# Patient Record
Sex: FEMALE | Race: WHITE | NOT HISPANIC OR LATINO | Employment: FULL TIME | URBAN - METROPOLITAN AREA
[De-identification: names, ages, dates, MRNs, and addresses within clinical notes are randomized per-mention and may not be internally consistent; named-entity substitution may affect disease eponyms.]

---

## 2021-08-23 ENCOUNTER — TRANSFERRED RECORDS (OUTPATIENT)
Dept: HEALTH INFORMATION MANAGEMENT | Facility: CLINIC | Age: 46
End: 2021-08-23

## 2021-09-11 ENCOUNTER — TRANSFERRED RECORDS (OUTPATIENT)
Dept: HEALTH INFORMATION MANAGEMENT | Facility: CLINIC | Age: 46
End: 2021-09-11

## 2021-09-15 ENCOUNTER — TRANSCRIBE ORDERS (OUTPATIENT)
Dept: OTHER | Age: 46
End: 2021-09-15

## 2021-09-15 ENCOUNTER — MEDICAL CORRESPONDENCE (OUTPATIENT)
Dept: HEALTH INFORMATION MANAGEMENT | Facility: CLINIC | Age: 46
End: 2021-09-15

## 2021-09-15 DIAGNOSIS — G47.33 OSA (OBSTRUCTIVE SLEEP APNEA): Primary | ICD-10-CM

## 2021-09-28 ENCOUNTER — TELEPHONE (OUTPATIENT)
Dept: OTOLARYNGOLOGY | Facility: CLINIC | Age: 46
End: 2021-09-28

## 2021-09-28 NOTE — TELEPHONE ENCOUNTER
----- Message from Valentino Mahmood MD sent at 9/28/2021  1:26 PM CDT -----  Regarding: Inspire appointment  The patient made an appointment to discuss inspire this Friday.  I reviewed her sleep study and she is not a candidate for inspire.     Inspire requires the AHI (sleep apnea severity, calculated on sleep study) needs to be less than 65 (her AHI is 67.8) and a BMI of less than 32 kg/m2 (her BMI is 35.8 kg/m2).     I am happy to meet with her to discuss further if she would like, but she will ultimately need to return to sleep medicine to discuss other options.      Could you contact her and let her know?    IJL

## 2021-09-29 NOTE — PROGRESS NOTES
Chief Complaint   Patient presents with     Ent Problem     would like to discuss getting inspire- history of severe snoring and poor sleep     History of Present Illness   Meliza Rob is a 46 year old female who presents today for evaluation.  The patient describes long history of obstructive sleep apnea.  Several years ago, the patient had a sleep study showing significant sleep apnea with an AHI well over 100.  She is lost about 40 to 50 pounds since that time and recently underwent a repeat home sleep study.      The patient underwent a home sleep study on 8/23/2021 which showed an AHI of 67.8 events per hour.  The patient supine AHI was 89, left side AHI was 69.7, right-sided AHI was 30.9.  The patient's BMI at the time of the study was 35.8 kg/m     The patient has tried CPAP and several masks.  She has tried a chinstrap in the past without benefit.  She has a history of TMJ and did not pursue oral appliance therapy given her significant problems.  The patient's current BMI is 34.9 kg/m .  To be a BMI of less than 32, the patient will need to be roughly 192 pounds, she is 210 pounds today.  She does continue to work on weight loss.    Past Medical History  There is no problem list on file for this patient.    Current Medications     Current Outpatient Medications:      amLODIPine (NORVASC) 5 MG tablet, Take 10 mg by mouth daily Unsure of dose, Disp: , Rfl:      cetirizine (ZYRTEC) 10 MG tablet, Take 10 mg by mouth daily, Disp: , Rfl:      hydrochlorothiazide (HYDRODIURIL) 12.5 MG tablet, Take 12.5 mg by mouth daily, Disp: , Rfl:      pioglitazone (ACTOS) 15 MG tablet, Take 15 mg by mouth daily, Disp: , Rfl:      Semaglutide (OZEMPIC, 0.25 OR 0.5 MG/DOSE, SC), Once weekly, Disp: , Rfl:     Allergies  Allergies   Allergen Reactions     Bactrim [Sulfamethoxazole W/Trimethoprim] Hives     Lisinopril      Lips swell up     Amoxicillin Hives       Social History   Social History     Socioeconomic History      "Marital status: Single     Spouse name: Not on file     Number of children: Not on file     Years of education: Not on file     Highest education level: Not on file   Occupational History     Not on file   Tobacco Use     Smoking status: Not on file   Substance and Sexual Activity     Alcohol use: Not on file     Drug use: Not on file     Sexual activity: Not on file   Other Topics Concern     Not on file   Social History Narrative     Not on file     Social Determinants of Health     Financial Resource Strain:      Difficulty of Paying Living Expenses:    Food Insecurity:      Worried About Running Out of Food in the Last Year:      Ran Out of Food in the Last Year:    Transportation Needs:      Lack of Transportation (Medical):      Lack of Transportation (Non-Medical):    Physical Activity:      Days of Exercise per Week:      Minutes of Exercise per Session:    Stress:      Feeling of Stress :    Social Connections:      Frequency of Communication with Friends and Family:      Frequency of Social Gatherings with Friends and Family:      Attends Sabianist Services:      Active Member of Clubs or Organizations:      Attends Club or Organization Meetings:      Marital Status:    Intimate Partner Violence:      Fear of Current or Ex-Partner:      Emotionally Abused:      Physically Abused:      Sexually Abused:        Family History  Family History   Problem Relation Age of Onset     Diabetes Mother      Hypertension Mother      Diabetes Father      Hypertension Father      Hypertension Brother        Review of Systems  As per HPI and PMHx, otherwise 10+ comprehensive system review is negative.    Physical Exam  BP (!) 142/104 (BP Location: Right arm, Patient Position: Chair, Cuff Size: Adult Regular)   Temp 99  F (37.2  C) (Tympanic)   Ht 1.651 m (5' 5\")   Wt 95.3 kg (210 lb)   BMI 34.95 kg/m    GENERAL: Patient is a pleasant, cooperative 46 year old female in no acute distress.  HEAD: Normocephalic, " atraumatic.  Hair and scalp are normal.  EYES: Pupils are equal, round, reactive to light and accommodation.  Extraocular movements are intact.  The sclera nonicteric without injection.  The extraocular structures are normal.  EARS: Normal shape and symmetry.  No tenderness when palpating the mastoid or tragal areas bilaterally.   NOSE: Nares are patent.  Nasal mucosa is pink and moist.  Nasal septum is midline.  The inferior turbinates appear normal.  No nasal cavity masses, polyps, or mucopurulence on anterior rhinoscopy.  ORAL CAVITY: Dentition is in good repair.  Mucous membranes are moist.  Tongue is mobile, protrudes to the midline.  Palate elevates symmetrically.  Tonsils are 2+, symmetric.  No erythema or exudate.  No oral cavity or oropharyngeal masses, lesions, ulcerations, leukoplakia.  The patient has a Land tongue/palate position grade 4.  NECK: Supple, trachea is midline.  The patient has 3 fingerbreadths of hyomental distance.  There no palpable cervical lymphadenopathy or masses bilaterally.  Palpation of the bilateral parotid and submandibular areas reveal no masses.  No thyromegaly.    NEUROLOGIC: Cranial nerves II through XII are grossly intact.  Voice is strong.  Patient is House-Brackmann I/VI bilaterally.  CARDIOVASCULAR: Extremities are warm and well-perfused.  No significant peripheral edema.  RESPIRATORY: Patient has nonlabored breathing without cough, wheeze, stridor.  PSYCHIATRIC: Patient is alert and oriented.  Mood and affect appear normal.  SKIN: Warm and dry.  No scalp, face, or neck lesions noted.    Assessment and Plan     ICD-10-CM    1. Severe obstructive sleep apnea  G47.33    2. Intolerance of continuous positive airway pressure (CPAP) ventilation  Z78.9    3. BMI 34.0-34.9,adult  Z68.34      It was my pleasure seeing Meliza YULISA Darron today in clinic.  The patient presents to clinic today with severe obstructive sleep apnea intolerant to CPAP.  The patient's BMI is is just  under 35.  They are interested in the hypoglossal nerve stimulator.  We discussed placement of the hypoglossal nerve stimulator including postoperative course, activation, need for battery change, limitation of the chest/abdomen/pelvis MRI, need for alteration of airport screening.  We discussed the need of drug induced sleep endoscopy to ensure candidacy.      Given the patient's AHI also being out of range, I think that she be a candidate for an in lab full night study.  I will reach out to our sleep medicine folks to see if they can set her up with a new study.     We discussed the risks, benefits, alternatives, options of drug-induced sleep endoscopy including, but not, limited to: risk of general anesthesia, potential need for additional procedures.  We discussed the postoperative course and convalescence and need for  the day of the procedure.  The patient voiced understanding and is willing to proceed.    Valentino Mahmood MD  Department of Otolaryngology-Head and Neck Surgery  [facility] [place]

## 2021-10-01 ENCOUNTER — OFFICE VISIT (OUTPATIENT)
Dept: OTOLARYNGOLOGY | Facility: CLINIC | Age: 46
End: 2021-10-01
Payer: COMMERCIAL

## 2021-10-01 VITALS
TEMPERATURE: 99 F | WEIGHT: 210 LBS | BODY MASS INDEX: 34.99 KG/M2 | HEIGHT: 65 IN | SYSTOLIC BLOOD PRESSURE: 142 MMHG | DIASTOLIC BLOOD PRESSURE: 104 MMHG

## 2021-10-01 DIAGNOSIS — G47.33 SEVERE OBSTRUCTIVE SLEEP APNEA: Primary | ICD-10-CM

## 2021-10-01 DIAGNOSIS — Z78.9 INTOLERANCE OF CONTINUOUS POSITIVE AIRWAY PRESSURE (CPAP) VENTILATION: ICD-10-CM

## 2021-10-01 PROCEDURE — 99204 OFFICE O/P NEW MOD 45 MIN: CPT | Performed by: OTOLARYNGOLOGY

## 2021-10-01 RX ORDER — PIOGLITAZONEHYDROCHLORIDE 15 MG/1
15 TABLET ORAL DAILY
COMMUNITY

## 2021-10-01 RX ORDER — CETIRIZINE HYDROCHLORIDE 10 MG/1
10 TABLET ORAL DAILY
COMMUNITY

## 2021-10-01 RX ORDER — AMLODIPINE BESYLATE 5 MG/1
10 TABLET ORAL DAILY
COMMUNITY

## 2021-10-01 RX ORDER — HYDROCHLOROTHIAZIDE 12.5 MG/1
12.5 TABLET ORAL DAILY
COMMUNITY

## 2021-10-01 ASSESSMENT — PAIN SCALES - GENERAL: PAINLEVEL: NO PAIN (0)

## 2021-10-01 ASSESSMENT — MIFFLIN-ST. JEOR: SCORE: 1598.43

## 2021-10-01 NOTE — LETTER
10/1/2021         RE: Meliza Rob  [address] [address]        Dear Colleague,    Thank you for referring your patient, Meliza Rob, to the [facility]. Please see a copy of my visit note below.    Chief Complaint   Patient presents with     Ent Problem     would like to discuss getting inspire- history of severe snoring and poor sleep     History of Present Illness   Meliza Rob is a 46 year old female who presents today for evaluation.  The patient describes long history of obstructive sleep apnea.  Several years ago, the patient had a sleep study showing significant sleep apnea with an AHI well over 100.  She is lost about 40 to 50 pounds since that time and recently underwent a repeat home sleep study.      The patient underwent a home sleep study on 8/23/2021 which showed an AHI of 67.8 events per hour.  The patient supine AHI was 89, left side AHI was 69.7, right-sided AHI was 30.9.  The patient's BMI at the time of the study was 35.8 kg/m     The patient has tried CPAP and several masks.  She has tried a chinstrap in the past without benefit.  She has a history of TMJ and did not pursue oral appliance therapy given her significant problems.  The patient's current BMI is 34.9 kg/m .  To be a BMI of less than 32, the patient will need to be roughly 192 pounds, she is 210 pounds today.  She does continue to work on weight loss.    Past Medical History  There is no problem list on file for this patient.    Current Medications     Current Outpatient Medications:      amLODIPine (NORVASC) 5 MG tablet, Take 10 mg by mouth daily Unsure of dose, Disp: , Rfl:      cetirizine (ZYRTEC) 10 MG tablet, Take 10 mg by mouth daily, Disp: , Rfl:      hydrochlorothiazide (HYDRODIURIL) 12.5 MG tablet, Take 12.5 mg by mouth daily, Disp: , Rfl:      pioglitazone (ACTOS) 15 MG tablet, Take 15 mg by mouth daily, Disp: , Rfl:      Semaglutide (OZEMPIC, 0.25 OR 0.5 MG/DOSE, SC), Once  weekly, Disp: , Rfl:     Allergies  Allergies   Allergen Reactions     Bactrim [Sulfamethoxazole W/Trimethoprim] Hives     Lisinopril      Lips swell up     Amoxicillin Hives       Social History   Social History     Socioeconomic History     Marital status: Single     Spouse name: Not on file     Number of children: Not on file     Years of education: Not on file     Highest education level: Not on file   Occupational History     Not on file   Tobacco Use     Smoking status: Not on file   Substance and Sexual Activity     Alcohol use: Not on file     Drug use: Not on file     Sexual activity: Not on file   Other Topics Concern     Not on file   Social History Narrative     Not on file     Social Determinants of Health     Financial Resource Strain:      Difficulty of Paying Living Expenses:    Food Insecurity:      Worried About Running Out of Food in the Last Year:      Ran Out of Food in the Last Year:    Transportation Needs:      Lack of Transportation (Medical):      Lack of Transportation (Non-Medical):    Physical Activity:      Days of Exercise per Week:      Minutes of Exercise per Session:    Stress:      Feeling of Stress :    Social Connections:      Frequency of Communication with Friends and Family:      Frequency of Social Gatherings with Friends and Family:      Attends Yazidism Services:      Active Member of Clubs or Organizations:      Attends Club or Organization Meetings:      Marital Status:    Intimate Partner Violence:      Fear of Current or Ex-Partner:      Emotionally Abused:      Physically Abused:      Sexually Abused:        Family History  Family History   Problem Relation Age of Onset     Diabetes Mother      Hypertension Mother      Diabetes Father      Hypertension Father      Hypertension Brother        Review of Systems  As per HPI and PMHx, otherwise 10+ comprehensive system review is negative.    Physical Exam  BP (!) 142/104 (BP Location: Right arm, Patient Position: Chair,  "Cuff Size: Adult Regular)   Temp 99  F (37.2  C) (Tympanic)   Ht 1.651 m (5' 5\")   Wt 95.3 kg (210 lb)   BMI 34.95 kg/m    GENERAL: Patient is a pleasant, cooperative 46 year old female in no acute distress.  HEAD: Normocephalic, atraumatic.  Hair and scalp are normal.  EYES: Pupils are equal, round, reactive to light and accommodation.  Extraocular movements are intact.  The sclera nonicteric without injection.  The extraocular structures are normal.  EARS: Normal shape and symmetry.  No tenderness when palpating the mastoid or tragal areas bilaterally.   NOSE: Nares are patent.  Nasal mucosa is pink and moist.  Nasal septum is midline.  The inferior turbinates appear normal.  No nasal cavity masses, polyps, or mucopurulence on anterior rhinoscopy.  ORAL CAVITY: Dentition is in good repair.  Mucous membranes are moist.  Tongue is mobile, protrudes to the midline.  Palate elevates symmetrically.  Tonsils are 2+, symmetric.  No erythema or exudate.  No oral cavity or oropharyngeal masses, lesions, ulcerations, leukoplakia.  The patient has a Land tongue/palate position grade 4.  NECK: Supple, trachea is midline.  The patient has 3 fingerbreadths of hyomental distance.  There no palpable cervical lymphadenopathy or masses bilaterally.  Palpation of the bilateral parotid and submandibular areas reveal no masses.  No thyromegaly.    NEUROLOGIC: Cranial nerves II through XII are grossly intact.  Voice is strong.  Patient is House-Brackmann I/VI bilaterally.  CARDIOVASCULAR: Extremities are warm and well-perfused.  No significant peripheral edema.  RESPIRATORY: Patient has nonlabored breathing without cough, wheeze, stridor.  PSYCHIATRIC: Patient is alert and oriented.  Mood and affect appear normal.  SKIN: Warm and dry.  No scalp, face, or neck lesions noted.    Assessment and Plan     ICD-10-CM    1. Severe obstructive sleep apnea  G47.33    2. Intolerance of continuous positive airway pressure (CPAP) " ventilation  Z78.9    3. BMI 34.0-34.9,adult  Z68.34      It was my pleasure seeing Meliza Rob today in clinic.  The patient presents to clinic today with severe obstructive sleep apnea intolerant to CPAP.  The patient's BMI is is just under 35.  They are interested in the hypoglossal nerve stimulator.  We discussed placement of the hypoglossal nerve stimulator including postoperative course, activation, need for battery change, limitation of the chest/abdomen/pelvis MRI, need for alteration of airport screening.  We discussed the need of drug induced sleep endoscopy to ensure candidacy.      Given the patient's AHI also being out of range, I think that she be a candidate for an in lab full night study.  I will reach out to our sleep medicine folks to see if they can set her up with a new study.     We discussed the risks, benefits, alternatives, options of drug-induced sleep endoscopy including, but not, limited to: risk of general anesthesia, potential need for additional procedures.  We discussed the postoperative course and convalescence and need for  the day of the procedure.  The patient voiced understanding and is willing to proceed.    Valentino Mahmood MD  Department of Otolaryngology-Head and Neck Surgery  [facility]        Again, thank you for allowing me to participate in the care of your patient.        Sincerely,        Valentino Mahmood MD

## 2021-10-14 DIAGNOSIS — G47.33 OSA (OBSTRUCTIVE SLEEP APNEA): Primary | ICD-10-CM

## 2022-02-11 ENCOUNTER — THERAPY VISIT (OUTPATIENT)
Dept: SLEEP MEDICINE | Facility: CLINIC | Age: 47
End: 2022-02-11
Payer: COMMERCIAL

## 2022-02-11 DIAGNOSIS — G47.33 OSA (OBSTRUCTIVE SLEEP APNEA): ICD-10-CM

## 2022-02-11 PROCEDURE — 95810 POLYSOM 6/> YRS 4/> PARAM: CPT | Performed by: INTERNAL MEDICINE

## 2022-02-22 LAB — SLPCOMP: NORMAL

## 2022-03-03 NOTE — PROGRESS NOTES
Chief Complaint   Patient presents with     Ent Problem     Consult Inspire-sleep study - has tried CPAP machine several years ago and did not like it - would come off at night     History of Present Illness  Meliza Rob is a 46 year old female who presents today for follow-up. The patient describes long history of obstructive sleep apnea.  Several years ago, the patient had a sleep study showing significant sleep apnea with an AHI well over 100.  She is lost about 40 to 50 pounds since that time and underwent a repeat home sleep study on 8/23/2021 which showed an AHI of 67.8 events per hour.  The patient supine AHI was 89, left side AHI was 69.7, right-sided AHI was 30.9.  The patient's BMI at the time of the study was 35.8 kg/m . The patient has tried CPAP and several masks.  She has tried a chinstrap in the past without benefit.  She has a history of TMJ and did not pursue oral appliance therapy given her significant problems.      Given that the patient's AHI was out of range, we pursued an in lab full night study.  The patient underwent a full night in lab study on 2/11/2022. The polysomnogram revealed a presence of 20 obstructive, 5 central, and 2 mixed apneas resulting in an apnea index of 3.8 events per hour. There were 108 obstructive hypopneas and 0 central hypopneas resulting in an obstructive hypopnea index of 15.3 and central hypopnea index of - events per hour. The combined apnea/hypopnea index was 19.1 events per hour (central apnea/hypopnea index was 0.7 events per hour). The REM AHI was 23.8 events per hour. The supine AHI was 30.2 events per hour; 13.5/hour non-supine.  The patient's BMI at the time of the most recent sleep study was 35 kg/m .    The patient's current BMI is 34.28 kg/m .  To be a BMI of less than 32, the patient will need to be roughly 192 pounds, she is 206 pounds today.  She does continue to work on weight loss.    Past Medical History  There is no problem list on file for  this patient.    Current Medications    Current Outpatient Medications:      amLODIPine (NORVASC) 5 MG tablet, Take 10 mg by mouth daily Unsure of dose, Disp: , Rfl:      cetirizine (ZYRTEC) 10 MG tablet, Take 10 mg by mouth daily, Disp: , Rfl:      hydrochlorothiazide (HYDRODIURIL) 12.5 MG tablet, Take 12.5 mg by mouth daily, Disp: , Rfl:      pioglitazone (ACTOS) 15 MG tablet, Take 15 mg by mouth daily, Disp: , Rfl:      Semaglutide (OZEMPIC, 0.25 OR 0.5 MG/DOSE, SC), Once weekly, Disp: , Rfl:     Allergies  Allergies   Allergen Reactions     Bactrim [Sulfamethoxazole W/Trimethoprim] Hives     Lisinopril      Lips swell up     Amoxicillin Hives       Social History  Social History     Socioeconomic History     Marital status: Single     Spouse name: Not on file     Number of children: Not on file     Years of education: Not on file     Highest education level: Not on file   Occupational History     Not on file   Tobacco Use     Smoking status: Never Smoker     Smokeless tobacco: Never Used   Substance and Sexual Activity     Alcohol use: Not on file     Comment: occas     Drug use: Never     Sexual activity: Not on file   Other Topics Concern     Not on file   Social History Narrative     Not on file     Social Determinants of Health     Financial Resource Strain: Not on file   Food Insecurity: Not on file   Transportation Needs: Not on file   Physical Activity: Not on file   Stress: Not on file   Social Connections: Not on file   Intimate Partner Violence: Not on file   Housing Stability: Not on file       Family History  Family History   Problem Relation Age of Onset     Diabetes Mother      Hypertension Mother      Diabetes Father      Hypertension Father      Hypertension Brother        Review of Systems  As per HPI and PMHx, otherwise 10 system review including the head and neck, constitutional, eyes, respiratory, GI, skin, neurologic, lymphatic, endocrine, and allergy systems is negative.    Physical  "Exam  BP (!) 137/95 (BP Location: Left arm, Patient Position: Sitting, Cuff Size: Adult Regular)   Pulse 94   Temp 98.1  F (36.7  C) (Tympanic)   Ht 1.651 m (5' 5\")   Wt 93.4 kg (206 lb)   BMI 34.28 kg/m    GENERAL: Patient is a pleasant, cooperative 46 year old female in no acute distress.  HEAD: Normocephalic, atraumatic.  Hair and scalp are normal.  EYES: Pupils are equal, round, reactive to light and accommodation.  Extraocular movements are intact.  The sclera nonicteric without injection.  The extraocular structures are normal.  EARS: Normal shape and symmetry.  No tenderness when palpating the mastoid or tragal areas bilaterally.   NOSE: Nares are patent.  Nasal mucosa is pink and moist.  Nasal septum is midline.  The inferior turbinates appear normal.  No nasal cavity masses, polyps, or mucopurulence on anterior rhinoscopy.  ORAL CAVITY: Dentition is in good repair.  Mucous membranes are moist.  Tongue is mobile, protrudes to the midline.  Palate elevates symmetrically.  Tonsils are 2+, symmetric.  No erythema or exudate.  No oral cavity or oropharyngeal masses, lesions, ulcerations, leukoplakia.  The patient has a Land tongue/palate position grade 4.  NECK: Supple, trachea is midline.  The patient has 3 fingerbreadths of hyomental distance.  There no palpable cervical lymphadenopathy or masses bilaterally.  Palpation of the bilateral parotid and submandibular areas reveal no masses.  No thyromegaly.    NEUROLOGIC: Cranial nerves II through XII are grossly intact.  Voice is strong.  Patient is House-Brackmann I/VI bilaterally.  CARDIOVASCULAR: Extremities are warm and well-perfused.  No significant peripheral edema.  RESPIRATORY: Patient has nonlabored breathing without cough, wheeze, stridor.  PSYCHIATRIC: Patient is alert and oriented.  Mood and affect appear normal.  SKIN: Warm and dry.  No scalp, face, or neck lesions noted.    Assessment and Plan     ICD-10-CM    1. Severe obstructive sleep " apnea  G47.33 Case Request: DRUG INDUCED SLEEP ENDOSCOPY   2. Intolerance of continuous positive airway pressure (CPAP) ventilation  Z78.9 Case Request: DRUG INDUCED SLEEP ENDOSCOPY   3. BMI 34.0-34.9,adult  Z68.34 Case Request: DRUG INDUCED SLEEP ENDOSCOPY      It was my pleasure seeing Meliza Rob today in clinic.  The patient presents to clinic today with  moderate obstructive sleep apnea intolerant to CPAP.  The patient's BMI today is  34.28 kg/m .  They are interested in the hypoglossal nerve stimulator.  We discussed placement of the hypoglossal nerve stimulator including postoperative course, activation, need for battery change, limitation of the chest/abdomen/pelvis MRI, need for alteration of airport screening.  We discussed the need of drug induced sleep endoscopy to ensure candidacy.       We discussed the risks, benefits, alternatives, options of drug-induced sleep endoscopy including, but not, limited to: risk of general anesthesia, potential need for additional procedures.  We discussed the postoperative course and convalescence and need for  the day of the procedure.  The patient voiced understanding and is willing to proceed.    Meliza to follow up with Primary Care provider regarding elevated blood pressure.    Valentino Mahmood MD  Department of Otolaryngology-Head and Neck Surgery  [facility] [place]

## 2022-03-10 ENCOUNTER — OFFICE VISIT (OUTPATIENT)
Dept: OTOLARYNGOLOGY | Facility: CLINIC | Age: 47
End: 2022-03-10
Payer: COMMERCIAL

## 2022-03-10 VITALS
HEART RATE: 94 BPM | TEMPERATURE: 98.1 F | WEIGHT: 206 LBS | BODY MASS INDEX: 34.32 KG/M2 | SYSTOLIC BLOOD PRESSURE: 137 MMHG | HEIGHT: 65 IN | DIASTOLIC BLOOD PRESSURE: 95 MMHG

## 2022-03-10 DIAGNOSIS — G47.33 SEVERE OBSTRUCTIVE SLEEP APNEA: Primary | ICD-10-CM

## 2022-03-10 DIAGNOSIS — Z78.9 INTOLERANCE OF CONTINUOUS POSITIVE AIRWAY PRESSURE (CPAP) VENTILATION: ICD-10-CM

## 2022-03-10 PROCEDURE — 99214 OFFICE O/P EST MOD 30 MIN: CPT | Performed by: OTOLARYNGOLOGY

## 2022-03-10 NOTE — NURSING NOTE
"Initial BP (!) 137/95 (BP Location: Left arm, Patient Position: Sitting, Cuff Size: Adult Regular)   Pulse 94   Temp 98.1  F (36.7  C) (Tympanic)   Ht 1.651 m (5' 5\")   Wt 93.4 kg (206 lb)   BMI 34.28 kg/m   Estimated body mass index is 34.28 kg/m  as calculated from the following:    Height as of this encounter: 1.651 m (5' 5\").    Weight as of this encounter: 93.4 kg (206 lb). .    Mariana Kruger CMA    "

## 2022-03-10 NOTE — PATIENT INSTRUCTIONS
Per physician instructions      If you have questions or concerns on any instructions given to you by your provider today or if you need to schedule an appointment, you can reach us at [phone].

## 2022-03-10 NOTE — LETTER
3/10/2022         RE: Meliza Rob  [address] [place] [id number]        Dear Colleague,    Thank you for referring your patient, Meliza Rob, to the [facility]. Please see a copy of my visit note below.    Chief Complaint   Patient presents with     Ent Problem     Consult [facility]-sleep study - has tried CPAP machine several years ago and did not like it - would come off at night     History of Present Illness  Meliza Rob is a 46 year old female who presents today for follow-up. The patient describes long history of obstructive sleep apnea.  Several years ago, the patient had a sleep study showing significant sleep apnea with an AHI well over 100.  She is lost about 40 to 50 pounds since that time and underwent a repeat home sleep study on 8/23/2021 which showed an AHI of 67.8 events per hour.  The patient supine AHI was 89, left side AHI was 69.7, right-sided AHI was 30.9.  The patient's BMI at the time of the study was 35.8 kg/m . The patient has tried CPAP and several masks.  She has tried a chinstrap in the past without benefit.  She has a history of TMJ and did not pursue oral appliance therapy given her significant problems.      Given that the patient's AHI was out of range, we pursued an in lab full night study.  The patient underwent a full night in lab study on 2/11/2022. The polysomnogram revealed a presence of 20 obstructive, 5 central, and 2 mixed apneas resulting in an apnea index of 3.8 events per hour. There were 108 obstructive hypopneas and 0 central hypopneas resulting in an obstructive hypopnea index of 15.3 and central hypopnea index of - events per hour. The combined apnea/hypopnea index was 19.1 events per hour (central apnea/hypopnea index was 0.7 events per hour). The REM AHI was 23.8 events per hour. The supine AHI was 30.2 events per hour; 13.5/hour non-supine.  The patient's BMI at the time of the most recent sleep study was 35 kg/m .    The  patient's current BMI is 34.28 kg/m .  To be a BMI of less than 32, the patient will need to be roughly 192 pounds, she is 206 pounds today.  She does continue to work on weight loss.    Past Medical History  There is no problem list on file for this patient.    Current Medications    Current Outpatient Medications:      amLODIPine (NORVASC) 5 MG tablet, Take 10 mg by mouth daily Unsure of dose, Disp: , Rfl:      cetirizine (ZYRTEC) 10 MG tablet, Take 10 mg by mouth daily, Disp: , Rfl:      hydrochlorothiazide (HYDRODIURIL) 12.5 MG tablet, Take 12.5 mg by mouth daily, Disp: , Rfl:      pioglitazone (ACTOS) 15 MG tablet, Take 15 mg by mouth daily, Disp: , Rfl:      Semaglutide (OZEMPIC, 0.25 OR 0.5 MG/DOSE, SC), Once weekly, Disp: , Rfl:     Allergies  Allergies   Allergen Reactions     Bactrim [Sulfamethoxazole W/Trimethoprim] Hives     Lisinopril      Lips swell up     Amoxicillin Hives       Social History  Social History     Socioeconomic History     Marital status: Single     Spouse name: Not on file     Number of children: Not on file     Years of education: Not on file     Highest education level: Not on file   Occupational History     Not on file   Tobacco Use     Smoking status: Never Smoker     Smokeless tobacco: Never Used   Substance and Sexual Activity     Alcohol use: Not on file     Comment: occas     Drug use: Never     Sexual activity: Not on file   Other Topics Concern     Not on file   Social History Narrative     Not on file     Social Determinants of Health     Financial Resource Strain: Not on file   Food Insecurity: Not on file   Transportation Needs: Not on file   Physical Activity: Not on file   Stress: Not on file   Social Connections: Not on file   Intimate Partner Violence: Not on file   Housing Stability: Not on file       Family History  Family History   Problem Relation Age of Onset     Diabetes Mother      Hypertension Mother      Diabetes Father      Hypertension Father       "Hypertension Brother        Review of Systems  As per HPI and PMHx, otherwise 10 system review including the head and neck, constitutional, eyes, respiratory, GI, skin, neurologic, lymphatic, endocrine, and allergy systems is negative.    Physical Exam  BP (!) 137/95 (BP Location: Left arm, Patient Position: Sitting, Cuff Size: Adult Regular)   Pulse 94   Temp 98.1  F (36.7  C) (Tympanic)   Ht 1.651 m (5' 5\")   Wt 93.4 kg (206 lb)   BMI 34.28 kg/m    GENERAL: Patient is a pleasant, cooperative 46 year old female in no acute distress.  HEAD: Normocephalic, atraumatic.  Hair and scalp are normal.  EYES: Pupils are equal, round, reactive to light and accommodation.  Extraocular movements are intact.  The sclera nonicteric without injection.  The extraocular structures are normal.  EARS: Normal shape and symmetry.  No tenderness when palpating the mastoid or tragal areas bilaterally.   NOSE: Nares are patent.  Nasal mucosa is pink and moist.  Nasal septum is midline.  The inferior turbinates appear normal.  No nasal cavity masses, polyps, or mucopurulence on anterior rhinoscopy.  ORAL CAVITY: Dentition is in good repair.  Mucous membranes are moist.  Tongue is mobile, protrudes to the midline.  Palate elevates symmetrically.  Tonsils are 2+, symmetric.  No erythema or exudate.  No oral cavity or oropharyngeal masses, lesions, ulcerations, leukoplakia.  The patient has a Land tongue/palate position grade 4.  NECK: Supple, trachea is midline.  The patient has 3 fingerbreadths of hyomental distance.  There no palpable cervical lymphadenopathy or masses bilaterally.  Palpation of the bilateral parotid and submandibular areas reveal no masses.  No thyromegaly.    NEUROLOGIC: Cranial nerves II through XII are grossly intact.  Voice is strong.  Patient is House-Brackmann I/VI bilaterally.  CARDIOVASCULAR: Extremities are warm and well-perfused.  No significant peripheral edema.  RESPIRATORY: Patient has nonlabored " breathing without cough, wheeze, stridor.  PSYCHIATRIC: Patient is alert and oriented.  Mood and affect appear normal.  SKIN: Warm and dry.  No scalp, face, or neck lesions noted.    Assessment and Plan     ICD-10-CM    1. Severe obstructive sleep apnea  G47.33 Case Request: DRUG INDUCED SLEEP ENDOSCOPY   2. Intolerance of continuous positive airway pressure (CPAP) ventilation  Z78.9 Case Request: DRUG INDUCED SLEEP ENDOSCOPY   3. BMI 34.0-34.9,adult  Z68.34 Case Request: DRUG INDUCED SLEEP ENDOSCOPY      It was my pleasure seeing Meliza Rob today in clinic.  The patient presents to clinic today with  moderate obstructive sleep apnea intolerant to CPAP.  The patient's BMI today is  84.28 kg/m .  They are interested in the hypoglossal nerve stimulator.  We discussed placement of the hypoglossal nerve stimulator including postoperative course, activation, need for battery change, limitation of the chest/abdomen/pelvis MRI, need for alteration of airport screening.  We discussed the need of drug induced sleep endoscopy to ensure candidacy.       We discussed the risks, benefits, alternatives, options of drug-induced sleep endoscopy including, but not, limited to: risk of general anesthesia, potential need for additional procedures.  We discussed the postoperative course and convalescence and need for  the day of the procedure.  The patient voiced understanding and is willing to proceed.    Valentino Mahmood MD  Department of Otolaryngology-Head and Neck Surgery  [facility]         Again, thank you for allowing me to participate in the care of your patient.        Sincerely,        Valentino Mahmood MD

## 2022-03-11 DIAGNOSIS — Z11.59 ENCOUNTER FOR SCREENING FOR OTHER VIRAL DISEASES: Primary | ICD-10-CM

## 2022-03-24 ENCOUNTER — TELEPHONE (OUTPATIENT)
Dept: OTOLARYNGOLOGY | Facility: CLINIC | Age: 47
End: 2022-03-24
Payer: COMMERCIAL

## 2022-03-24 NOTE — TELEPHONE ENCOUNTER
----- Message from Valentino Mahmood MD sent at 3/3/2022  9:24 AM CST -----  Regarding: PO Tonsil Phone Call  Call patient, ask post tonsil questions, cancel follow-up if not needed.    IJL

## 2022-03-24 NOTE — TELEPHONE ENCOUNTER
Message sent in error. Patient had not had tonsils out. Has upcoming appointment with provider     Edilma VANCE    Specialty Clinics - Flex RN

## 2022-04-02 ENCOUNTER — HEALTH MAINTENANCE LETTER (OUTPATIENT)
Age: 47
End: 2022-04-02

## 2022-04-04 DIAGNOSIS — Z11.59 ENCOUNTER FOR SCREENING FOR OTHER VIRAL DISEASES: Primary | ICD-10-CM

## 2022-04-06 ENCOUNTER — ANESTHESIA EVENT (OUTPATIENT)
Dept: SURGERY | Facility: CLINIC | Age: 47
End: 2022-04-06
Payer: COMMERCIAL

## 2022-04-15 LAB
ALT SERPL-CCNC: 45 U/L
AST SERPL-CCNC: 27 U/L (ref 14–36)
CHOLESTEROL (EXTERNAL): 175 MG/DL (ref 0–200)
CREATININE (EXTERNAL): 0.6 MG/DL (ref 0.7–1.4)
GFR ESTIMATED (EXTERNAL): >60 ML/MIN/1.73M2
GLUCOSE (EXTERNAL): 214 MG/DL (ref 60–99)
HBA1C MFR BLD: 8.6 %
HDLC SERPL-MCNC: 60 MG/DL (ref 35–65)
LDL CHOLESTEROL CALCULATED (EXTERNAL): 93 MG/DL (ref 0–130)
POTASSIUM (EXTERNAL): 3.8 MEQ/L (ref 3.5–5.1)
TRIGLYCERIDES (EXTERNAL): 113 MG/DL (ref 0–200)

## 2022-04-17 ENCOUNTER — TRANSFERRED RECORDS (OUTPATIENT)
Dept: HEALTH INFORMATION MANAGEMENT | Facility: CLINIC | Age: 47
End: 2022-04-17
Payer: COMMERCIAL

## 2022-04-18 NOTE — ANESTHESIA PREPROCEDURE EVALUATION
Anesthesia Pre-Procedure Evaluation    Patient: Meliza Rob   MRN: [id number] : 1975        Procedure : Procedure(s):  DRUG INDUCED SLEEP ENDOSCOPY          No past medical history on file.   History reviewed. No pertinent surgical history.   Allergies   Allergen Reactions     Bactrim [Sulfamethoxazole W/Trimethoprim] Hives     Lisinopril      Lips swell up     Amoxicillin Hives      Social History     Tobacco Use     Smoking status: Never Smoker     Smokeless tobacco: Never Used   Substance Use Topics     Alcohol use: Not on file     Comment: occas      Wt Readings from Last 1 Encounters:   03/10/22 93.4 kg (206 lb)        Anesthesia Evaluation            ROS/MED HX  ENT/Pulmonary:     (+) sleep apnea, DELFINA risk factors,     Neurologic:  - neg neurologic ROS     Cardiovascular:     (+) hypertension-----    METS/Exercise Tolerance:     Hematologic:  - neg hematologic  ROS     Musculoskeletal:  - neg musculoskeletal ROS     GI/Hepatic:  - neg GI/hepatic ROS     Renal/Genitourinary:  - neg Renal ROS     Endo:     (+) Obesity,     Psychiatric/Substance Use:  - neg psychiatric ROS     Infectious Disease:  - neg infectious disease ROS     Malignancy:       Other:            Physical Exam    Airway  airway exam normal      Mallampati: II   TM distance: > 3 FB   Neck ROM: full   Mouth opening: > 3 cm    Respiratory Devices and Support         Dental  no notable dental history         Cardiovascular   cardiovascular exam normal          Pulmonary   pulmonary exam normal                OUTSIDE LABS:  CBC: No results found for: WBC, HGB, HCT, PLT  BMP: No results found for: NA, POTASSIUM, CHLORIDE, CO2, BUN, CR, GLC  COAGS: No results found for: PTT, INR, FIBR  POC: No results found for: BGM, HCG, HCGS  HEPATIC: No results found for: ALBUMIN, PROTTOTAL, ALT, AST, GGT, ALKPHOS, BILITOTAL, BILIDIRECT, ADE  OTHER: No results found for: PH, LACT, A1C, ADALGISA, PHOS, MAG, LIPASE, AMYLASE, TSH, T4, T3, CRP,  SED    Anesthesia Plan    ASA Status:  2   NPO Status:  NPO Appropriate    Anesthesia Type: General.     - Airway: Native airway   Induction: Intravenous.   Maintenance: TIVA.        Consents    Anesthesia Plan(s) and associated risks, benefits, and realistic alternatives discussed. Questions answered and patient/representative(s) expressed understanding.    - Discussed:     - Discussed with:  Patient         Postoperative Care    Pain management: IV analgesics, Multi-modal analgesia.        Comments:                Kenny Land CRNA, APRN CRNA

## 2022-04-19 ENCOUNTER — ANESTHESIA (OUTPATIENT)
Dept: SURGERY | Facility: CLINIC | Age: 47
End: 2022-04-19
Payer: COMMERCIAL

## 2022-04-19 ENCOUNTER — HOSPITAL ENCOUNTER (OUTPATIENT)
Facility: CLINIC | Age: 47
Discharge: HOME | End: 2022-04-19
Attending: OTOLARYNGOLOGY | Admitting: OTOLARYNGOLOGY
Payer: COMMERCIAL

## 2022-04-19 VITALS
HEART RATE: 75 BPM | DIASTOLIC BLOOD PRESSURE: 75 MMHG | BODY MASS INDEX: 34.32 KG/M2 | OXYGEN SATURATION: 97 % | TEMPERATURE: 98.2 F | WEIGHT: 206 LBS | HEIGHT: 65 IN | RESPIRATION RATE: 16 BRPM | SYSTOLIC BLOOD PRESSURE: 127 MMHG

## 2022-04-19 PROCEDURE — 710N000012 HC RECOVERY PHASE 2, PER MINUTE: Performed by: OTOLARYNGOLOGY

## 2022-04-19 PROCEDURE — 258N000003 HC RX IP 258 OP 636: Performed by: NURSE ANESTHETIST, CERTIFIED REGISTERED

## 2022-04-19 PROCEDURE — 250N000009 HC RX 250: Performed by: NURSE ANESTHETIST, CERTIFIED REGISTERED

## 2022-04-19 PROCEDURE — 360N000074 HC SURGERY LEVEL 1, PER MIN: Performed by: OTOLARYNGOLOGY

## 2022-04-19 PROCEDURE — 999N000141 HC STATISTIC PRE-PROCEDURE NURSING ASSESSMENT: Performed by: OTOLARYNGOLOGY

## 2022-04-19 PROCEDURE — 250N000011 HC RX IP 250 OP 636: Performed by: NURSE ANESTHETIST, CERTIFIED REGISTERED

## 2022-04-19 PROCEDURE — 42975 DISE EVAL SLP DO BRTH FLX DX: CPT | Performed by: OTOLARYNGOLOGY

## 2022-04-19 PROCEDURE — 370N000017 HC ANESTHESIA TECHNICAL FEE, PER MIN: Performed by: OTOLARYNGOLOGY

## 2022-04-19 PROCEDURE — 272N000001 HC OR GENERAL SUPPLY STERILE: Performed by: OTOLARYNGOLOGY

## 2022-04-19 RX ORDER — SODIUM CHLORIDE, SODIUM LACTATE, POTASSIUM CHLORIDE, CALCIUM CHLORIDE 600; 310; 30; 20 MG/100ML; MG/100ML; MG/100ML; MG/100ML
INJECTION, SOLUTION INTRAVENOUS CONTINUOUS
Status: DISCONTINUED | OUTPATIENT
Start: 2022-04-19 | End: 2022-04-19 | Stop reason: HOSPADM

## 2022-04-19 RX ORDER — LIDOCAINE 40 MG/G
CREAM TOPICAL
Status: DISCONTINUED | OUTPATIENT
Start: 2022-04-19 | End: 2022-04-19 | Stop reason: HOSPADM

## 2022-04-19 RX ORDER — ONDANSETRON 4 MG/1
4 TABLET, ORALLY DISINTEGRATING ORAL
Status: DISCONTINUED | OUTPATIENT
Start: 2022-04-19 | End: 2022-04-19 | Stop reason: HOSPADM

## 2022-04-19 RX ORDER — PROPOFOL 10 MG/ML
INJECTION, EMULSION INTRAVENOUS CONTINUOUS PRN
Status: DISCONTINUED | OUTPATIENT
Start: 2022-04-19 | End: 2022-04-19

## 2022-04-19 RX ADMIN — LIDOCAINE HYDROCHLORIDE 0.1 ML: 10 INJECTION, SOLUTION EPIDURAL; INFILTRATION; INTRACAUDAL; PERINEURAL at 10:03

## 2022-04-19 RX ADMIN — SODIUM CHLORIDE, POTASSIUM CHLORIDE, SODIUM LACTATE AND CALCIUM CHLORIDE: 600; 310; 30; 20 INJECTION, SOLUTION INTRAVENOUS at 10:02

## 2022-04-19 RX ADMIN — PROPOFOL 100 MCG/KG/MIN: 10 INJECTION, EMULSION INTRAVENOUS at 10:31

## 2022-04-19 NOTE — DISCHARGE INSTRUCTIONS
Discharge Instructions    Recovery - Everyone recovers differently from a general anesthetic.  Symptoms such as fatigue, nausea, lightheadedness, and sometimes a low grade fever (up to 100 degrees) are not unusual.  As your body removes the anesthetic drugs from circulation, these symptoms will resolve.  You can resume a normal diet once awaking from anesthesia.  Once anesthesia wears off, you can return to normal activity.    Medications - Usually pain medication is not required after this procedure.  Over-the-counter pain medicine can be used if needed.    Follow up - We will contact you for follow-up after we submit to insurance.    If there are any questions or issues with the above, or if there are other issues that concern you, always feel free to call the clinic and I am happy to speak with you as soon as feasible.    Valentino Mahmood MD  Department of Otolaryngology-Head and Neck Surgery  [facility]  [phone] or [phone] After hours, [facility] option                        Same Day Surgery Discharge Instructions  Special Precautions After Surgery - Adult    It is not unusual to feel lightheaded or faint, up to 24 hours after surgery or while taking pain medication.  If you have these symptoms; sit for a few minutes before standing and have someone assist you when getting up.  You should rest and relax for the next 24 hours and must have someone stay with you for at least 24 hours after your discharge.  DO NOT DRIVE any vehicle or operate mechanical equipment for 24 hours following the end of your surgery.  DO NOT DRIVE while taking narcotic pain medications that have been prescribed by your physician.  If you had a limb operated on, you must be able to use it fully to drive.  DO NOT drink alcoholic beverages for 24 hours following surgery or while taking prescription pain medication.  Drink clear liquids (apple juice, ginger ale, broth, 7-Up, etc.).  Progress to your regular  diet as you feel able.  Any questions call your physician and do not make important decisions for 24 hours.           __________________________________________________________________________________________________________________________________  IMPORTANT NUMBERS:    [facility] [place]  [phone], [phone]  Pharmacy:  [phone]  Same Day Surgery:  [phone], Monday - Friday until 8:30 p.m.  Urgent Care:  [phone]  Emergency Room:  [phone]      [facility]:  [phone]                                                                             [facility]:  [phone] option 1  [facility]:  [phone]     [facility] Clinic:  [phone]   Sur[facility] Specialty Clinic:  [phone]   Home Medical Equipment: [phone]  [facility]:  [phone]

## 2022-04-19 NOTE — INTERVAL H&P NOTE
The History and Physical has been reviewed, the patient has been examined and no changes have occurred in the patient's condition since the H & P was completed.

## 2022-04-19 NOTE — OP NOTE
PREOPERATIVE DIAGNOSES: Moderate obstructive sleep apnea, intolerance to CPAP.     POSTOPERATIVE DIAGNOSES: Same.      PROCEDURE PERFORMED:   1. Drug-induced sleep endoscopy     SURGEON: Valentino Mahmood MD      ASSISTANTS: None.     BLOOD LOSS:  None.       COMPLICATIONS: None.      SPECIMENS: None.     ANESTHESIA: General.     GRAFTS, IMPLANTS, DRAINS: None.     INDICATIONS: The patient has a history of moderate obstructive sleep apnea and is intolerant to CPAP.  The patient presents today for drug-induced sleep endoscopy to assess candidacy for implantation of upper airway stimulation.     FINDINGS:   1. Primarily anteroposterior collapse of the velopharyngeal airway.   2. Patient is a candidate for hypoglossal nerve upper airway stimulation.     OPERATIVE TECHNIQUE: The patient was brought to the operating room and identified by name clinic number.  They were placed supinely on the operating room table.  The patient was given a loading dose of propofol and then started on IV propofol infusion titrating up to 150 mcg/h to induce sleep state.  The patient was was kept spontaneously breathing.  We waited until the patient was nonresponsive and in a sleep state.  End-tidal CO2 was used to monitor breathing.      Once sleep state was reached, after standard surgical pause, the bilateral nares were anesthetized with 2 mL of 4% lidocaine and phenylephrine.  The nasopharynx was visualized.  The patient had primarily anteroposterior collapse in the velopharynx, primarily anteroposterior collapse of the oropharynx, primarily anteroposterior collapse of the tongue base, primarily anterioposterior collapse of the epiglottis.                     Based on the patient's examination, the patient would be a candidate for the hypoglossal nerve stimulator.     This marked the end of the procedure.  The patient was then turned over to anesthesia for recovery where they were awakened and transferred to the PACU in excellent condition.   There were no complications.  There was minimal blood loss.  All standard operating room protocol and universal precautions were used throughout the procedure.     Valentino Mahmood MD  Department of Otolaryngology-Head and Neck Surgery  [facility]

## 2022-04-19 NOTE — ANESTHESIA POSTPROCEDURE EVALUATION
Patient: Meliza Rob    Procedure: Procedure(s):  DRUG INDUCED SLEEP ENDOSCOPY       Anesthesia Type:  General    Note:  Disposition: Outpatient   Postop Pain Control: Uneventful            Sign Out: Well controlled pain   PONV: No   Neuro/Psych: Uneventful            Sign Out: Acceptable/Baseline neuro status   Airway/Respiratory: Uneventful            Sign Out: Acceptable/Baseline resp. status   CV/Hemodynamics: Uneventful            Sign Out: Acceptable CV status; No obvious hypovolemia; No obvious fluid overload   Other NRE: NONE   DID A NON-ROUTINE EVENT OCCUR? No           Last vitals:  Vitals Value Taken Time   BP     Temp     Pulse     Resp     SpO2         Electronically Signed By: ROSE Montano CRNA  April 19, 2022  10:44 AM

## 2022-04-19 NOTE — ANESTHESIA CARE TRANSFER NOTE
Patient: Meliza Rob    Procedure: Procedure(s):  DRUG INDUCED SLEEP ENDOSCOPY       Diagnosis: Severe obstructive sleep apnea [G47.33]  Intolerance of continuous positive airway pressure (CPAP) ventilation [Z78.9]  BMI 34.0-34.9,adult [Z68.34]  Diagnosis Additional Information: No value filed.    Anesthesia Type:   General     Note:    Oropharynx: spontaneously breathing  Level of Consciousness: awake  Oxygen Supplementation: room air    Independent Airway: airway patency satisfactory and stable  Dentition: dentition unchanged  Vital Signs Stable: post-procedure vital signs reviewed and stable  Report to RN Given: handoff report given  Patient transferred to: Phase II    Handoff Report: Identifed the Patient, Identified the Reponsible Provider, Reviewed the pertinent medical history, Discussed the surgical course, Reviewed Intra-OP anesthesia mangement and issues during anesthesia, Set expectations for post-procedure period and Allowed opportunity for questions and acknowledgement of understanding      Vitals:  Vitals Value Taken Time   BP     Temp     Pulse     Resp     SpO2         Electronically Signed By: ROSE Montano CRNA  April 19, 2022  10:43 AM

## 2022-05-28 ENCOUNTER — HEALTH MAINTENANCE LETTER (OUTPATIENT)
Age: 47
End: 2022-05-28

## 2022-06-06 ENCOUNTER — HOSPITAL ENCOUNTER (OUTPATIENT)
Facility: AMBULATORY SURGERY CENTER | Age: 47
End: 2022-06-06
Attending: COLON & RECTAL SURGERY
Payer: COMMERCIAL

## 2022-07-07 ENCOUNTER — TELEPHONE (OUTPATIENT)
Dept: OTOLARYNGOLOGY | Facility: CLINIC | Age: 47
End: 2022-07-07

## 2022-10-01 ENCOUNTER — HEALTH MAINTENANCE LETTER (OUTPATIENT)
Age: 47
End: 2022-10-01

## 2023-05-13 ENCOUNTER — HEALTH MAINTENANCE LETTER (OUTPATIENT)
Age: 48
End: 2023-05-13

## 2023-06-04 ENCOUNTER — HEALTH MAINTENANCE LETTER (OUTPATIENT)
Age: 48
End: 2023-06-04

## 2024-07-21 ENCOUNTER — HEALTH MAINTENANCE LETTER (OUTPATIENT)
Age: 49
End: 2024-07-21

## 2024-11-25 ENCOUNTER — TELEPHONE (OUTPATIENT)
Dept: DERMATOLOGY | Facility: CLINIC | Age: 49
End: 2024-11-25
Payer: COMMERCIAL

## 2024-11-25 NOTE — TELEPHONE ENCOUNTER
[facility] Call Center    Phone Message      Reason for Call: Appointment Intake      Referring Provider Name:   Self Referred (online Appt request)    Diagnosis and/or Symptoms:   Pt said :  To discuss HS        Action Taken: Message routed to:  Other: none - noting that I called Pt from online Appt request    Travel Screening: Not Applicable

## 2025-01-22 ENCOUNTER — TRANSFERRED RECORDS (OUTPATIENT)
Dept: HEALTH INFORMATION MANAGEMENT | Facility: CLINIC | Age: 50
End: 2025-01-22
Payer: COMMERCIAL

## 2025-01-22 LAB
ALT SERPL-CCNC: 23 U/L (ref 6–29)
AST SERPL-CCNC: 16 U/L (ref 10–35)
CREATININE (EXTERNAL): 0.77 MG/DL (ref 0.5–0.99)
GFR ESTIMATED (EXTERNAL): 95 ML/MIN/1.73M2
GLUCOSE (EXTERNAL): 252 MG/DL (ref 65–99)

## 2025-02-20 NOTE — TELEPHONE ENCOUNTER
FUTURE VISIT INFORMATION      FUTURE VISIT INFORMATION:  Date: 3.27.25  Time: 9:00  Location: [facility]  REFERRAL INFORMATION:  Referring provider:  Jez  Referring providers clinic:  [facility]  Reason for visit/diagnosis  per pt, HS referral [facility] , recs in [facility]      RECORDS REQUESTED FROM:       Clinic name Comments Records Status Imaging Status   [facility] 1.22.25  Jez Ann

## 2025-03-27 ENCOUNTER — OFFICE VISIT (OUTPATIENT)
Dept: DERMATOLOGY | Facility: CLINIC | Age: 50
End: 2025-03-27
Attending: DERMATOLOGY
Payer: COMMERCIAL

## 2025-03-27 ENCOUNTER — PRE VISIT (OUTPATIENT)
Dept: DERMATOLOGY | Facility: CLINIC | Age: 50
End: 2025-03-27
Payer: COMMERCIAL

## 2025-03-27 VITALS
TEMPERATURE: 98.1 F | DIASTOLIC BLOOD PRESSURE: 84 MMHG | OXYGEN SATURATION: 97 % | WEIGHT: 199.3 LBS | SYSTOLIC BLOOD PRESSURE: 140 MMHG | HEART RATE: 80 BPM | BODY MASS INDEX: 33.17 KG/M2

## 2025-03-27 DIAGNOSIS — D84.9 IMMUNOSUPPRESSION: ICD-10-CM

## 2025-03-27 DIAGNOSIS — L73.2 HIDRADENITIS SUPPURATIVA: Primary | ICD-10-CM

## 2025-03-27 PROCEDURE — G0009 ADMIN PNEUMOCOCCAL VACCINE: HCPCS | Performed by: DERMATOLOGY

## 2025-03-27 PROCEDURE — 90472 IMMUNIZATION ADMIN EACH ADD: CPT | Performed by: DERMATOLOGY

## 2025-03-27 PROCEDURE — 90750 HZV VACC RECOMBINANT IM: CPT | Performed by: DERMATOLOGY

## 2025-03-27 PROCEDURE — 99214 OFFICE O/P EST MOD 30 MIN: CPT | Performed by: DERMATOLOGY

## 2025-03-27 PROCEDURE — 90677 PCV20 VACCINE IM: CPT | Performed by: DERMATOLOGY

## 2025-03-27 PROCEDURE — 250N000021 HC RX MED A9270 GY (STAT IND- M) 250: Performed by: DERMATOLOGY

## 2025-03-27 PROCEDURE — 99204 OFFICE O/P NEW MOD 45 MIN: CPT | Performed by: DERMATOLOGY

## 2025-03-27 PROCEDURE — 250N000011 HC RX IP 250 OP 636: Performed by: DERMATOLOGY

## 2025-03-27 RX ORDER — ATORVASTATIN CALCIUM 80 MG/1
80 TABLET, FILM COATED ORAL
COMMUNITY
Start: 2025-03-24

## 2025-03-27 RX ORDER — LOSARTAN POTASSIUM 50 MG/1
TABLET ORAL
COMMUNITY
Start: 2023-08-10

## 2025-03-27 RX ORDER — CHOLECALCIFEROL (VITAMIN D3) 50 MCG
2000 TABLET ORAL DAILY
COMMUNITY

## 2025-03-27 RX ORDER — DIPHENOXYLATE HYDROCHLORIDE AND ATROPINE SULFATE 2.5; .025 MG/1; MG/1
1 TABLET ORAL
COMMUNITY

## 2025-03-27 RX ORDER — RESORCINOL
POWDER (GRAM) MISCELLANEOUS
Qty: 30 G | Refills: 11 | Status: SHIPPED | OUTPATIENT
Start: 2025-03-27

## 2025-03-27 RX ORDER — BUPROPION HYDROCHLORIDE 150 MG/1
1 TABLET ORAL DAILY
COMMUNITY

## 2025-03-27 RX ORDER — METOPROLOL SUCCINATE 50 MG/1
50 TABLET, EXTENDED RELEASE ORAL DAILY
COMMUNITY
Start: 2025-03-24

## 2025-03-27 RX ORDER — TIRZEPATIDE 7.5 MG/.5ML
7.5 INJECTION, SOLUTION SUBCUTANEOUS WEEKLY
COMMUNITY
Start: 2024-05-01

## 2025-03-27 RX ORDER — ADALIMUMAB-ATTO 40 MG/.4ML
40 INJECTION SUBCUTANEOUS
COMMUNITY
Start: 2025-03-14

## 2025-03-27 RX ADMIN — ZOSTER VACCINE RECOMBINANT, ADJUVANTED 0.5 ML: KIT at 11:14

## 2025-03-27 RX ADMIN — PNEUMOCOCCAL 20-VALENT CONJUGATE VACCINE 0.5 ML
2.2; 2.2; 2.2; 2.2; 2.2; 2.2; 2.2; 2.2; 2.2; 2.2; 2.2; 2.2; 2.2; 2.2; 2.2; 2.2; 4.4; 2.2; 2.2; 2.2 INJECTION, SUSPENSION INTRAMUSCULAR at 11:13

## 2025-03-27 NOTE — PATIENT INSTRUCTIONS
PLAN  - Continue hibiclens  - Start adalimumab  - Will obtain lab results and take over orderng ([facility] pharmacy referral placed to help)  - Shingles and pneumonia vaccines today. Will need a second shingles vaccine in 8 weeks  - Added to wait list for laser hair removal and letter written  - Dr Hills's information:   [email or web address]    Call for appointment: [phone]  - Flair plan   Resorcinol 15% in vaseline twice a day as needed for flares (sent to [facility])   [facility]  Address: [address] [place] [address]  Phone: [phone]    Phone visit for follow-up in 12 weeks    HIDRADENITIS SUPPURATIVA     What is hidradenitis suppurativa (HS)?  Hidradenitis suppurativa (HS) is a chronic skin disorder affecting the hair follicles. The disease starts with sore red lumps (or boils), typically involving the armpits, breasts, lower abdomen, groin, and buttocks. These lesions appear suddenly, increase in size and then burst or rupture, usually under the surface of the skin. This causes severe pain. Sometimes they rupture to the surface of the skin, draining pus. In some people, they can result in tunnels under the skin that may or may not continue to drain. When the lumps heal, they can leave scars.     Facts:   HS is a chronic skin disease, that comes and goes in flares, and is very painful  HS happens in many people - men and women, young and elderly, all races and ethnicities. But HS is more common in young adults, women  and skin of color  One out of three people with HS has a family member with HS   HS is NOT due to an infection or washing habits  HS is NOT contagious, so it cannot be spread from one person to another person   HS is NOT caused by how well you wash or what soaps you use  HS is NOT caused by smoking or obesity, but quitting smoking and losing weight have helped some people        Causes  The cause of HS remains unclear. It is thought that there is a problem in the hair  follicle that makes it weaker and easier to break open. The current theory is that there are three steps that cause an HS lesion to form::   The hair follicle gets plugged   The hair follicle swells and then ruptures, causing pain and inflammation   In some people, the inflammation does not stop and results in tunneling through the skin       Associated Conditions  HS is associated with several other medical conditions and activities including:  Tobacco use  High cholesterol, heart disease and stroke   Type 2 diabetes   Depression and anxiety   Arthritis, which causes stiffness and pain in joints   Inflammatory bowel disease (including Crohn's disease and ulcerative colitis)  Severe acne and pilonidal sinus/cyst  Polycystic ovarian syndrome  Skin cancer in areas of longstanding HS lesions, typically in the groin or buttocks (rare)    It is important to ask your physician to watch out for these conditions.      To help manage mental health issues related to HS and emotional support:  Help finding a mental health specialist: https://www.psychologyMoglue.[employer]/us/therapists  Suicide prevention (24/7 free confidential support):   Phone: (497) 201-3681  Website: https://suicidepreventionlifeline.org/    Support groups:    Hope for HS: www.hopeforhs.org  HS Connect: www.[facility].org    HS Patient Support Application for your phone: Papaya (developed in collaboration with patient and community HS advocacy groups)    Intimacy support: American Association of Sexuality Educators, Counselors and Therapists (AASECT) website: https://www.aasect.org      For help quitting smoking   Phone:  English: 1-744-LJRH-NOW (1-430.774.3625)  Somali: 0-673-LIPFBI-VIVIEN (1-719.984.7718)    Website:   English: https://smokefree.gov/  Somali: matthew.smokefree.gov     Lifestyle Modifications   Quitting smoking or weight loss can help your overall health and may help improve HS symptoms in some people, but not everyone.   It is recommended to eat  a well-balanced, healthy diet (https://health.gov/dietaryguidelines) and to maintain a healthy weight. Avoiding dietary triggers can help some people suffering with HS, but will not necessarily help others with HS.    Some people may find that friction, irritation, and rubbing may worsen HS symptoms. Some people do better with loose, breathable clothing and fabrics. Shaving close to the affected areas may also worsen HS in some people. But, not everyone has the same triggers for their HS, so see what works for you!    Some medications may worsen HS such as lithium, testosterone, and some progesterone-only birth control.  Please discuss this with your provider before stopping medication.     Zinc supplementation has been shown in some studies to help HS. However, every treatment can have a side effects,  so talk to your provider before starting any treatment.     Treatment                    Medicines, procedures and surgeries are offered to treat HS. Treatment can help reduce breakouts and improve quality of life.  Medicines used:  Topical washes (e.g. chlorhexidine, benzoyl peroxide)    Clindamycin on the skin - seems effective for pustules and papules. Probably not helpful for larger chronic lesions.     Oral antibiotics (e.g. doxycycline, clindamycin + rifampin, dapsone) - antibiotics may help some, but not all patients    Hormonal therapies (e.g. spironolactone, oral contraceptives) - primarily used in females though to have a hormonal component to their HS (e.g perimenstrual flares, worsening in pregnancy, polycystic ovarian syndrome)    Immunosuppression (e.g. prednisone)     Injectables (e.g. adalimumab, secukinumab, bimekizumab, infliximab) - Adalimumab, Secukinumab, Bimekziumab are the only federal drug administration (FDA) approved medication for HS  Education for adalimumab injections: https://www.makerist.com/humira-complete/injection  Adalimumab abassador program:   Website:  https://www."[employer]com/humira-complete/sign-up/  Phone: 8.075.2AMITRA (1.679.281.1350)    Procedures:  Intralesional steroid injections - may help areas of acute active inflammation     Some hair removal lasers - If considering this option, we recommend doing this only with a medical professional that has experience treating HS.     Surgeries:  Incision and drainage - Provides fast, short-term relief, but symptoms likely to recur.        Deroofing - This surgery involves opening the lesion and cleaning out the base. This treatment is effective for recurring lesions.  Recurrence rates seem to be similar to excision. These are typically left open and allowed to heal on their own over 1-3 months      Excision - This involves cutting out chronic lesions.  This may entail cutting out a large affected area referred to as wide local excision, or cutting out single lesions, referred to as localized excisions.  Excision may be done with a scalpel, electric heating device or laser (e.g. carbon dioxide [CO2] laser).  These are either allowed to heal on their own, closed with sutures, or closed with a graft or flap.  Grafts are typically done by taking skin from one site of the body and using it to close another part of the body.  Flaps are done by moving tissue around to close a wound.     Check out this website to help decide the best treatment options for you!     https://www.informed-decisions.org/hidradenitispda.php

## 2025-03-27 NOTE — NURSING NOTE
Administrations This Visit       pneumococcal (PREVNAR 20) injection 0.5 mL       Admin Date  03/27/2025 Action  $Given Dose  0.5 mL Route  Intramuscular Documented By  Ruthie Wang MA              zoster vaccine recombinant adjuvanted (SHINGRIX) injection 0.5 mL       Admin Date  03/27/2025 Action  $Given Dose  0.5 mL Route  Intramuscular Documented By  Ruthie Wang MA Michelle Weems, CMA  Rheumatology & ID  [address] [place] [address]  [phone]

## 2025-03-27 NOTE — LETTER
"    Meliza Rob  [address] [place] [address]  : 1975  MRN:  [id number]      2025      To Whom This May Concern,       We are writing to request coverage of long-pulsed 1064-nm Nd: YAG laser treatment for recalcitrant hidradenitis suppurativa with significant effect on quality of life. This patient has symptoms of including intermittent folliculitis, inflammatory nodules, abscesses and intermittent draining tunnels in groin and thighs.    Hidradenitis responds well to ND-YAG laser hair removal as it is a follicular process. We would expect 1 treatment every 3- 6 weeks for a maximum of 12 treatments.     Finally, this treatment has been recommended in some situations by the North American HS Management Guidelines (J Am Acad Dermatol. 2020 Nov;83(5):4340-7753)    Please, see the following references:   Rosalba Granda et al. \"Randomized control trial for the treatment of hidradenitis suppurativa with a neodymium?doped yttrium aluminium garnet laser.\" Dermatologic surgery 35.8 (2009): 0394-8855.     Lynsey Gonzales, et al. \"Histopathologic study of hidradenitis suppurativa following long-pulsed 1064-nm Nd: YAG laser treatment.\" Archives of dermatology 147.1 (2011): 21-28.   Geovany Knowles., et al. \"Prospective controlled clinical and histopathologic study of hidradenitis suppurativa treated with the long-pulsed neodymium: yttrium-aluminium-garnet laser.\" Journal of the American Academy of Dermatology 62.4 (2010): 637-645.     We strive to provide our patient with outstanding care. Therefore, I request you please contact me at [phone] if you have any questions regarding coverage or our treatment rational.     Devin Calles MD, FAAD, FACP      Department of Dermatology   [facility]   Phone([facility]): [phone]   "

## 2025-03-27 NOTE — LETTER
3/27/2025       RE: Meliza Rob  [address] [place] [address]     Dear Colleague,    Thank you for referring your patient, Meliza Rob, to the [facility] [place] [facility]. Please see a copy of my visit note below.    [facility] Hidradenitis Suppurativa Clinic Visit  Encounter Date: Mar 27, 2025  Office Visit     Dermatology Problem List:  1. Hidradenitis Suppurativa  - Hep C neg 1/22/25    2. DMII more than 10 years ago (Hgb A1c 8.4 12/2024)  - metformin 500mg daily  - pioglitizone 15mg daily  - Mounjaro 7.5mg daily  3. HTN   4. NSTEMI s/p BAIRON to pLAD   5. Hx of gastric bypass surgery 2012  6. S/p hysterectomy 2008, ovaries intact, not post-menopausal    ____________________________________________    Assessment & Plan:   # HS   - Justin II  - Continue hibiclens wash  - Start humira load and then 40mg weekly  - Will get labs from [facility] and take over ordering   - Shingles and pneumonia vaccines  today      Follow-up: {kkfollowup:832459}    {kkstaffinvolved:730045}    ***  ____________________________________________    CC: Derm Problem (HS)    HPI:  Ms. Meliza Rob is a(n) 49 year old female who presents today as a new patient for HS. First diagnosed 2 years ago, but had symptoms for 20 years. Started off one a year but last 5 years have gotten worse. Has one tunnel in groin/thigh.  Saw a surgeon who cut out some of it, which was helpful but still flares in the area.     Given adalimumab but has not started it yet.    No new ones in 1 year.     HS Nurse Assessment        3/27/2025     9:24 AM   Nurse Assessment Data   Over the past 30 days how many old lesions flared back up? 3   Over the past 30 days how many new lesions did you get? 0   Over the past week, how many dressing changes do you do each day? 0   Over the past week, has your wound drainage been: None   Rate your HS  overall from 0 - 10 (0 = no disease, 10 = worst) over the past week:  3   Rate your pain score from 0 - 10 (0 = no disease, 10 = worst) for the most painful/symptomatic lesion in the past week:  0 - No Pain   Over the past week, how much has HS influenced your quality of life? slightly     She has tried spironolactone for 4-5 months with no improvement.   Tried doxycyline 100mg twice a day with no improvement.  ILK cam it down for a few turpin and then it comes back.   Bleach baths, zinc, waitch hzel, hibilcens with no improvement  Not sure if her HS flares occur with menstrual cycles.    Screenings  - Depression/Anxiety:  No  - Sexual dysfunction:  No  - Tobacco use: No  - PCOS:  No  - Obesity: DMII, hx of NSTEMI s/p BAIRON pLAD  - DMII or Insulin Resistance:  - Hypertension: +  - Hyperlipidemia: No high cholesterol  - Inflammatory bowel disease: No. BM once a day. No blood or mucous.   - Spondyloarthropathy:  No. No joint pains.     Patient is otherwise feeling well, without additional skin concerns.    HS Nurse Assessment        3/27/2025     9:24 AM   Nurse Assessment Data   Over the past 30 days how many old lesions flared back up? 2   Over the past 30 days how many new lesions did you get? 1   Over the past week, how many dressing changes do you do each day? 0   Over the past week, has your wound drainage been: None   Rate your HS overall from 0 - 10 (0 = no disease, 10 = worst) over the past week:  2-3, 2   Rate your pain score from 0 - 10 (0 = no disease, 10 = worst) for the most painful/symptomatic lesion in the past week:  0 - No Pain   Over the past week, how much has HS influenced your quality of life? slightly     FH:   - 2 cousins with RA  - 1 2nd cousin with vitiligo    Physical Exam:  Vitals: BP (!) 140/84   Pulse 80   Temp 98.1  F (36.7  C) (Oral)   Wt 90.4 kg (199 lb 4.8 oz)   SpO2 97%   BMI 33.17 kg/m    SKIN: {kkSkinExam:575480}  - ***  - {Skin Exam Derm:136546}.   - {Skin Exam Derm:380778}.   -  {Skin Exam Derm:304480}.   - No other lesions of concern on areas examined.     Medications:  Current Outpatient Medications   Medication Sig Dispense Refill     AMJEVITA 40 MG/0.4ML auto-injector kit Inject 40 mg subcutaneously every 7 days.       atorvastatin (LIPITOR) 80 MG tablet Take 80 mg by mouth.       cetirizine (ZYRTEC) 10 MG tablet Take 10 mg by mouth daily       losartan (COZAAR) 50 MG tablet Take by mouth.       metFORMIN (GLUCOPHAGE) 500 MG tablet Take 500 mg by mouth daily (with breakfast).       metoprolol succinate ER (TOPROL XL) 50 MG 24 hr tablet Take 50 mg by mouth daily.       MOUNJARO 7.5 MG/0.5ML SOAJ auto-injector pen Inject 7.5 mg subcutaneously once a week.       pioglitazone (ACTOS) 15 MG tablet Take 15 mg by mouth daily       amLODIPine (NORVASC) 5 MG tablet Take 10 mg by mouth daily Unsure of dose (Patient not taking: Reported on 3/27/2025)       buPROPion (WELLBUTRIN XL) 150 MG 24 hr tablet Take 1 tablet by mouth daily.       hydrochlorothiazide (HYDRODIURIL) 12.5 MG tablet Take 12.5 mg by mouth daily (Patient not taking: Reported on 3/27/2025)       Multiple Vitamin (MULTI-VITAMINS) TABS Take 1 tablet by mouth.       Semaglutide (OZEMPIC, 0.25 OR 0.5 MG/DOSE, SC) Once weekly (Patient not taking: Reported on 3/27/2025)       Vitamin D3 50 mcg (2000 units) tablet Take 2,000 Units by mouth daily.       No current facility-administered medications for this visit.        Past Medical History:   There is no problem list on file for this patient.    Past Medical History:   Diagnosis Date     Diabetes (H)      Hypertension      Motion sickness        CC Cody Story MD  [facility]  [address]  [place] [address] on close of this encounter.      Again, thank you for allowing me to participate in the care of your patient.      Sincerely,    Devin Calles MD     Hypertension      Motion sickness        CC Cody Story MD  [facility]  [address]  [place] [address] on close of this encounter.      Again, thank you for allowing me to participate in the care of your patient.      Sincerely,    Devin Calles MD

## 2025-03-27 NOTE — PROGRESS NOTES
[facility] Hidradenitis Suppurativa Clinic Visit  Encounter Date: Mar 27, 2025  Office Visit     Dermatology Problem List:  1. Hidradenitis Suppurativa  - Hep C neg 1/22/25  2. DMII more than 10 years ago (Hgb A1c 8.4 12/2024)  - metformin 500mg daily  - pioglitizone 15mg daily  - Mounjaro 7.5mg daily  3. HTN   4. NSTEMI s/p BAIRON to pLAD   5. Hx of gastric bypass surgery 2012  6. S/p hysterectomy 2008, ovaries intact, not post-menopausal    ____________________________________________    Assessment & Plan:   # HS   - Justin II  - Continue hibiclens wash  - Start humira load and then 40mg weekly  - Will get labs from [facility] and take over ordering   - Shingles and pneumonia vaccines  today  - Letter written and added to wait list. Also given Dr Hills's information    Follow-up: 12 weeks via phone    Devin Calles MD, FAAD, FACP     Departments of Internal Medicine and Dermatology  [facility]    ____________________________________________    CC: Derm Problem (HS)    HPI:  Ms. Meliza Rob is a(n) 49 year old female who presents today as a new patient for HS. First diagnosed 2 years ago, but had symptoms for 20 years. Started off one a year but last 5 years have gotten worse. Has one tunnel in groin/thigh.  Saw a surgeon who cut out some of it, which was helpful but still flares in the area.     Given adalimumab but has not started it yet.    No new ones in 1 year.     HS Nurse Assessment        3/27/2025     9:24 AM   Nurse Assessment Data   Over the past 30 days how many old lesions flared back up? 3   Over the past 30 days how many new lesions did you get? 0   Over the past week, how many dressing changes do you do each day? 0   Over the past week, has your wound drainage been: None   Rate your HS overall from 0 - 10 (0 = no disease, 10 = worst) over the past week:  3   Rate your pain score from 0 - 10 (0 = no disease, 10 = worst) for the most  painful/symptomatic lesion in the past week:  0 - No Pain   Over the past week, how much has HS influenced your quality of life? slightly     She has tried spironolactone for 4-5 months with no improvement.   Tried doxycyline 100mg twice a day with no improvement.  ILK cam it down for a few turpin and then it comes back.   Bleach baths, zinc, waitch hzel, hibilcens with no improvement  Not sure if her HS flares occur with menstrual cycles.    Screenings  - Depression/Anxiety:  No  - Sexual dysfunction:  No  - Tobacco use: No  - PCOS:  No  - Obesity: DMII, hx of NSTEMI s/p BAIRON pLAD  - DMII or Insulin Resistance:  - Hypertension: +  - Hyperlipidemia: No high cholesterol  - Inflammatory bowel disease: No. BM once a day. No blood or mucous.   - Spondyloarthropathy:  No. No joint pains.     Patient is otherwise feeling well, without additional skin concerns.    HS Nurse Assessment        3/27/2025     9:24 AM   Nurse Assessment Data   Over the past 30 days how many old lesions flared back up? 2   Over the past 30 days how many new lesions did you get? 1   Over the past week, how many dressing changes do you do each day? 0   Over the past week, has your wound drainage been: None   Rate your HS overall from 0 - 10 (0 = no disease, 10 = worst) over the past week:  2-3, 2   Rate your pain score from 0 - 10 (0 = no disease, 10 = worst) for the most painful/symptomatic lesion in the past week:  0 - No Pain   Over the past week, how much has HS influenced your quality of life? slightly     FH:   - 2 cousins with RA  - 1 2nd cousin with vitiligo    Physical Exam:  Vitals: BP (!) 140/84   Pulse 80   Temp 98.1  F (36.7  C) (Oral)   Wt 90.4 kg (199 lb 4.8 oz)   SpO2 97%   BMI 33.17 kg/m    SKIN: {kkSkinExam:609751}  - ***  - {Skin Exam Derm:893140}.   - {Skin Exam Derm:716909}.   - {Skin Exam Derm:766824}.   - No other lesions of concern on areas examined.     Medications:  Current Outpatient Medications   Medication Sig Dispense  Refill    AMJEVITA 40 MG/0.4ML auto-injector kit Inject 40 mg subcutaneously every 7 days.      atorvastatin (LIPITOR) 80 MG tablet Take 80 mg by mouth.      cetirizine (ZYRTEC) 10 MG tablet Take 10 mg by mouth daily      losartan (COZAAR) 50 MG tablet Take by mouth.      metFORMIN (GLUCOPHAGE) 500 MG tablet Take 500 mg by mouth daily (with breakfast).      metoprolol succinate ER (TOPROL XL) 50 MG 24 hr tablet Take 50 mg by mouth daily.      MOUNJARO 7.5 MG/0.5ML SOAJ auto-injector pen Inject 7.5 mg subcutaneously once a week.      pioglitazone (ACTOS) 15 MG tablet Take 15 mg by mouth daily      amLODIPine (NORVASC) 5 MG tablet Take 10 mg by mouth daily Unsure of dose (Patient not taking: Reported on 3/27/2025)      buPROPion (WELLBUTRIN XL) 150 MG 24 hr tablet Take 1 tablet by mouth daily.      hydrochlorothiazide (HYDRODIURIL) 12.5 MG tablet Take 12.5 mg by mouth daily (Patient not taking: Reported on 3/27/2025)      Multiple Vitamin (MULTI-VITAMINS) TABS Take 1 tablet by mouth.      Semaglutide (OZEMPIC, 0.25 OR 0.5 MG/DOSE, SC) Once weekly (Patient not taking: Reported on 3/27/2025)      Vitamin D3 50 mcg (2000 units) tablet Take 2,000 Units by mouth daily.       No current facility-administered medications for this visit.        Past Medical History:   There is no problem list on file for this patient.    Past Medical History:   Diagnosis Date    Diabetes (H)     Hypertension     Motion sickness        CC Cody Story MD  [facility]ER[facility]  [address]  [place] [address] on close of this encounter.

## 2025-03-31 ENCOUNTER — TELEPHONE (OUTPATIENT)
Dept: DERMATOLOGY | Facility: CLINIC | Age: 50
End: 2025-03-31
Payer: COMMERCIAL

## 2025-03-31 NOTE — TELEPHONE ENCOUNTER
MTM referral from: [facility] visit (referral by provider)    MTM referral outreach attempt #2 on March 31, 2025 at 2:34 PM      Outcome: Patient not reachable after several attempts, routed to Pharmacist Team/Provider as an FYI    Use hbc for the carrier/Plan on the flowsheet      [employer] Message Sent    Angela Avina CPhT  MTM

## 2025-04-08 ENCOUNTER — VIRTUAL VISIT (OUTPATIENT)
Dept: PHARMACY | Facility: CLINIC | Age: 50
End: 2025-04-08
Attending: DERMATOLOGY
Payer: COMMERCIAL

## 2025-04-08 ENCOUNTER — TELEPHONE (OUTPATIENT)
Dept: PHARMACY | Facility: CLINIC | Age: 50
End: 2025-04-08
Payer: COMMERCIAL

## 2025-04-08 DIAGNOSIS — E11.9 TYPE 2 DIABETES MELLITUS (H): ICD-10-CM

## 2025-04-08 DIAGNOSIS — E78.5 HYPERLIPIDEMIA: ICD-10-CM

## 2025-04-08 DIAGNOSIS — I10 HYPERTENSION: ICD-10-CM

## 2025-04-08 DIAGNOSIS — F90.9 ADHD (ATTENTION DEFICIT HYPERACTIVITY DISORDER): ICD-10-CM

## 2025-04-08 DIAGNOSIS — L73.2 HIDRADENITIS SUPPURATIVA: Primary | ICD-10-CM

## 2025-04-08 RX ORDER — RESORCINOL
POWDER (GRAM) MISCELLANEOUS
Qty: 30 G | Refills: 11 | Status: SHIPPED | OUTPATIENT
Start: 2025-04-08

## 2025-04-08 RX ORDER — PIOGLITAZONE 30 MG/1
30 TABLET ORAL DAILY
COMMUNITY

## 2025-04-08 RX ORDER — BUPROPION HYDROCHLORIDE 300 MG/1
300 TABLET ORAL EVERY MORNING
COMMUNITY

## 2025-04-08 RX ORDER — CHLORHEXIDINE GLUCONATE 40 MG/ML
SOLUTION TOPICAL DAILY PRN
COMMUNITY

## 2025-04-08 NOTE — PROGRESS NOTES
Medication Therapy Management (MTM) Encounter    ASSESSMENT:                            Hidradenitis suppurativa:   Recently completed first loading dose of Amjevita (adalimumab-atto) & inflammation of flares improving. Would benefit from continuing current regimen. Reviewed dosing, administration, potential side effects, precautions & time to response today. Also reviewed instructions for use of resorcinol ointment & resent Rx today.     Pre-biologic labs: completed at outside lab - viral screening negative   Vaccines: Avoid live vaccines. Discussed she will need to receive second shingrix dose between 5/27/25 to 09/27/25 - will coordinate this at next visit     Hypertension:   Last blood pressure reading near goal of <140/90 mmHg. Patient will address this further with primary care provider.     Hyperlipidemia   Stable.     Type 2 Diabetes   Last A1c not at goal of <7%. Due for recheck. Anticipate that A1c will be improved with initiation of Mounjaro a few months ago. Will continue to work with primary to titrate dose as tolerated and needed for blood sugar management.     ADHD:   Stable.     PLAN:                            Continue Amjevita (adalimumab-atto): Next dose of 80 mg (2 x 40 mg pens) due to this upcoming Saturday (4/12/25), then continue with 40 mg weekly starting 2 weeks later on 4/26/25   1 month supply sent to [facility] and additional refills sent to [facility]. Patient to let me know if she runs into any issues filling this here     Resorcinol 15% ointment resent to [facility] (P#[phone]). Apply twice daily as needed to HS flares    Message out to RN[facility] to schedule follow-up with Dr. Calles for sometime in July. If responding well to adalimumab at 3-month MTM follow-up will push out visit with Dr. Calles by additional 3 months    Follow-up: with me (Lucretia Mccracken [facility]) for an MTM follow up appointment by phone on 06/30/25 at 11:30 AM     Future considerations:   - Due for  "second Shingrix dose between 5/27/25 to 09/27/25. Send orders to local pharmacy    SUBJECTIVE/OBJECTIVE:                          Meliza Rob is a 49 year old female called for an initial visit. She was referred to me from Dr. Devin Calles MD.      Reason for visit: adalimumab initiation.    Medication Adherence/Access:   Amjevita coverage:   -  PA approved in outside system  - Filled loading dose already   - Currently fills at [facility]   - States preferred pharmacy is switching to [facility] at end of April     Hidradenitis suppurativa:   - Humira (adalimumab) 160 mg x 1 dose, then 80 mg 2 weeks later, then 40 mg weekly starting 2 weeks later (start date: 3/29/25)  - Metformin  mg daily   - Hibiclens (chlorhexidine) 4% wash daily as needed   - Flare plan:    - Resorcinol 15% in vaseline twice daily     Side effects: felt a little \"woozy\" after injection - dissipated after 5-10 minutes; no concerns with injection site reactions or other side effects     Patient reports she has noticed some improvements in inflammation with flares after first dose of 160 mg. Has final 80 mg load at home, then will need additional fills of maintenance dose. Affected areas: tunnel in groin/thigh     Specialist: Dr. Devin Calles MD, Dermatology. Last visit on 3/27/25.     Previous treatment:   - Spironolactone 100 mg daily - not effective     Pre-Biologic Screening: checked 01/23/2025 per outside records   Hep B Surface Antibody Non-reactive    Hep B Core Antibody  Non-reactive    Hep B Surface Antigen Non-reactive     Hep C Antibody  Non-reactive       Quantiferon TB Gold Negative  Last checked: 1/23/25  No reported risk factors or symptoms concerning for TB infection.     Immunization History   Covid-19 vaccine ([id number] version)  Due to receive   Influenza (annual) Up-to-date, avoid live FluMist   Pneumococcal  Pneumovax-23: 8/23/10   Prevnar-20: 3/27/25 Up-to-date   Tetanus/Tdap  Up-to-date   Shingrix (1st dose: 3/27/25) "  Due for 2nd dose between 5/27/25 to 09/27/25   All patients on biologics should avoid live vaccines (varicella/VZV, intranasal influenza, MMR, or yellow fever vaccine (if traveling))       Hypertension:   - Losartan 50 mg daily   - Metoprolol succinate 50 mg daily   No current side effects reported     Hyperlipidemia   -Atorvastatin 80 mg daily  No current side effects     Type 2 Diabetes   - Actos (pioglitazone) 30 mg daily   - Mounjaro (tirzepatide) 7.5 mg weekly (1st started ~05/2024)   - Metformin  mg daily   No current side effects reported.     Last A1c (0828/24): 10.7%   No home blood sugar readings reported today.     ADHD:   - Bupropion  mg daily   No current side effects. Finds relatively effective      Allergies/ADRs: Reviewed in chart  Past Medical History: Reviewed in chart  Tobacco: She reports that she has never smoked. She has never used smokeless tobacco.  ----------------    I spent 52 minutes with this patient today. All changes were made via collaborative practice agreement with Devin Calles.     A summary of these recommendations was sent via TrackIF.    Lucretia Mccracken, PharmD  Medication Therapy Management Pharmacist   [facility]     Telemedicine Visit Details  The patient's medications can be safely assessed via a telemedicine encounter.  Type of service:  Telephone visit  Originating Location (pt. Location): Home    Distant Location (provider location):  Off-site  Start Time:  12:30pm  End Time:  12:22pm     Medication Therapy Recommendations  No medication therapy recommendations to display

## 2025-04-08 NOTE — Clinical Note
Started adalimumab last week. Took over prescribing of maintenance dose as you mentioned in your note   Lucretia

## 2025-04-10 NOTE — PATIENT INSTRUCTIONS
Recommendations from today's MTM visit:                                                      Continue Amjevita (adalimumab-atto): Next dose of 80 mg (2 x 40 mg pens) due to this upcoming Saturday (4/12/25), then administer 40 mg (1 x 40 mg pen) 2 weeks later on 04/26/25, then continue with 40 mg once weekly thereafter     I sent a 1 month supply to [facility], and additional refills to [facility] so you can switch to filling here next month. Please let me know if you run into any issues with filling this at [facility] & we can find another pharmacy to get this from     Resorcinol 15% ointment resent to [facility] (P#[phone]). Apply twice daily as needed to HS flares     Follow-up: with me (Lucretia Mccracken [facility]) for an MTM follow up appointment by phone on 06/30/25 at 11:30 AM      We will also get you scheduled for a follow-up with Dr. Calles sometime in July. However, if your HS is responding well to the adalimumab and you do not have any concerns when we connect then we can push out your visit with him by another 3 months.      To schedule another MTM appointment, please call the clinic directly or you may call the MTM scheduling line at [phone] or toll-free at [phone].     My Clinical Pharmacist's contact information:                                                      Please feel free to contact me with any questions or concerns you have.      Lucretia Mccracken, PharmD  MTM Pharmacist  [facility]

## 2025-04-27 ENCOUNTER — HEALTH MAINTENANCE LETTER (OUTPATIENT)
Age: 50
End: 2025-04-27

## 2025-05-20 ENCOUNTER — MYC REFILL (OUTPATIENT)
Dept: PHARMACY | Facility: CLINIC | Age: 50
End: 2025-05-20
Payer: COMMERCIAL

## 2025-05-20 DIAGNOSIS — L73.2 HIDRADENITIS SUPPURATIVA: ICD-10-CM

## 2025-05-21 ENCOUNTER — TELEPHONE (OUTPATIENT)
Dept: DERMATOLOGY | Facility: CLINIC | Age: 50
End: 2025-05-21

## 2025-05-21 NOTE — TELEPHONE ENCOUNTER
Duplicate - prescription request refused. Sent in separate encounter this morning. Pharmacy liaisons are working on obtaining new PA.     Lucretia Mccracken RPh

## 2025-05-21 NOTE — TELEPHONE ENCOUNTER
PA Initiation    Medication: AMJEVITA 40 MG/0.4ML SC SOAJ  Insurance Company: [facility] - Phone [phone] Fax [phone]  Pharmacy Filling the Rx: "[facility] - [facility] - [place], [place] - [address]  Filling Pharmacy Phone:    Filling Pharmacy Fax:    Start Date: 5/21/2025    [id number]

## 2025-05-23 NOTE — TELEPHONE ENCOUNTER
PA still pending per CMM.  Plan has 1-5 business days to complete.  Updated pharmacy to [facility] per patient.

## 2025-05-29 NOTE — TELEPHONE ENCOUNTER
Prior Authorization Approval    Medication: AMJEVITA 40 MG/0.4ML SC SOAJ  Authorization Effective Date:    Authorization Expiration Date: 8/18/2025  Approved Dose/Quantity: 1.6ml for 28 days  Reference #: [id number]   Insurance Company: [employer] - Phone [phone] Fax [phone]  Expected CoPay: $    CoPay Card Available: No    Financial Assistance Needed: no  Which Pharmacy is filling the prescription: [facility] - [place], [place] - [facility] [place]  Pharmacy Notified: yes  Patient Notified: not needed renewal

## 2025-06-08 ENCOUNTER — HEALTH MAINTENANCE LETTER (OUTPATIENT)
Age: 50
End: 2025-06-08

## 2025-06-17 ENCOUNTER — TELEPHONE (OUTPATIENT)
Dept: DERMATOLOGY | Facility: CLINIC | Age: 50
End: 2025-06-17
Payer: COMMERCIAL

## 2025-06-17 NOTE — TELEPHONE ENCOUNTER
6/17 Left Voicemail (1st Attempt) and Sent Mychart (1st Attempt) for the patient to call back and schedule the following:    Appointment type: Return HS  Provider: Stevan  Return date: Next available  Specialty phone number: [phone]  Additional appointment(s) needed: na  Additonal Notes: phone visit

## 2025-06-30 ENCOUNTER — VIRTUAL VISIT (OUTPATIENT)
Dept: PHARMACY | Facility: CLINIC | Age: 50
End: 2025-06-30
Attending: DERMATOLOGY
Payer: COMMERCIAL

## 2025-06-30 DIAGNOSIS — D50.9 IRON DEFICIENCY ANEMIA, UNSPECIFIED IRON DEFICIENCY ANEMIA TYPE: ICD-10-CM

## 2025-06-30 DIAGNOSIS — L73.2 HIDRADENITIS SUPPURATIVA: Primary | ICD-10-CM

## 2025-06-30 DIAGNOSIS — E11.9 TYPE 2 DIABETES MELLITUS (H): ICD-10-CM

## 2025-06-30 NOTE — PROGRESS NOTES
Medication Therapy Management (MTM) Encounter    ASSESSMENT:                            Hidradenitis suppurativa:   Recent flare up due to being off therapy for ~3 weeks with changes in insurance but improved since resuming adalimumab. Severity and frequency of flares reduced. Tolerating without side effects. Patient would benefit from continuing adalimumab 40 mg weekly.  Since patient will have exhausted Amejvita savings card at time of next fill, agreed to switch to Simlandi (adalimumab-ryvk) which offers greater amount of copay assistance annually.     Vaccines: Avoid live vaccines. Recommended she receive second shingrix dose by 09/27/25. Scheduled vaccine appointment at [facility]     Type 2 Diabetes   Patient reported hemoglobin A1c improved at 7.5% but still not meeting goal of <7%. Plan with primary care to keep medications as is for now and titrate if needed with next check. Discussed option to increase Mounjaro or metformin dose for additional glycemic lowering.     Iron Deficiency Anemia:   Recent iron infusion; managed by primary     PLAN:                            Switch from Amjevita (adalimumab-atto) to Simlandi (adalimumab-ryvk) 40 mg weekly   Need to obtain prior authorization   Refills will be sent to [facility] (#[phone])   [email or web address]    Receive second Shingrix vaccine on 7/3/25 at 10:00 AM at [facility]    Patient to schedule follow-up with Dr. Calles for around 3 months from now (~9/30/25)     Follow-up: with me (Lucretia Mccracken [facility]) for an MTM follow up appointment by phone in around 6 months (1/1/2026)     SUBJECTIVE/OBJECTIVE:                          Meliza Rob is a 49 year old female called for a follow-up visit.      Reason for visit: adalimumab follow-up     Medication Adherence/Access:   Received return call from patient after our appointment. Updated me that Amjevita is $716.52 per month. Called [employer]  and max amount of copay card is $3000 per year. She will most likely run out of co-pay card funds with next fill. Patient contacted insurance and copay cards do not apply towards deductible. They informed her that they do not have a preferred biosimilar and any should be covered. Simlandi offers $9500 of co-pay assistance per year (per info from [facility] [place]). Patient prefers to switch to Simlandi to limit issues with upcoming fill.   Amjevita coverage:   - PA approved to 8/18/25  - [facility] (#[phone])     Hidradenitis suppurativa:   - Amjevita (adalimumab-atto) 40 mg weekly (start date: 3/29/25)  - Metformin  mg daily   - Hibiclens (chlorhexidine) 4% wash daily as needed   - Flare plan:    - Resorcinol 15% in vaseline twice daily     Side effects: None     Reports almost 3 week gap in therapy due to changes in insurance - HS flared during this time. Has been back on it consistently for 3-4 weeks & HS flares are improving again.     Affected areas: tunnel in groin/thigh     Specialist: Dr. Devin Calles MD, Dermatology. Last visit on 3/27/25.     Previous treatment:   - Spironolactone 100 mg daily - not effective     Immunization History   Covid-19 vaccine (0026-7938 version)  Due to receive   Influenza (annual) Up-to-date, avoid live FluMist   Pneumococcal  Pneumovax-23: 8/23/10   Prevnar-20: 3/27/25 Up-to-date   Tetanus/Tdap  Up-to-date   Shingrix (1st dose: 3/27/25)  Due for 2nd dose between 5/27/25 to 09/27/25   All patients on biologics should avoid live vaccines (varicella/VZV, intranasal influenza, MMR, or yellow fever vaccine (if traveling))       Type 2 Diabetes   - Actos (pioglitazone) 30 mg daily   - Mounjaro (tirzepatide) 7.5 mg weekly (1st started ~05/2024)   - Metformin  mg daily   No current side effects reported.     Reports hemoglobin A1c rechecked recently & believes it was ~7.5%. States that primary care decided to keep medications as is for now and may adjust if needed  for additional lowering. No home blood sugar readings reported today.     Iron Deficiency Anemia:   Received iron infusion last week. No side effects reported.     Allergies/ADRs: Reviewed in chart  Past Medical History: Reviewed in chart  Tobacco: She reports that she has never smoked. She has never used smokeless tobacco.  ----------------    I spent 20 minutes with this patient today. All changes were made via collaborative practice agreement with Devin Calles.     A summary of these recommendations was sent via We[employer].    Lucretia Mccracken, PharmD  Medication Therapy Management Pharmacist   [facility]     Telemedicine Visit Details  The patient's medications can be safely assessed via a telemedicine encounter.  Type of service:  Telephone visit  Originating Location (pt. Location): Home    Distant Location (provider location):  Off-site  Start Time: 11:39am  End Time: 11:59AM     Medication Therapy Recommendations  Hidradenitis suppurativa   1 Current Medication: adalimumab-atto (AMJEVITA) 40 MG/0.4ML auto-injector kit   Current Medication Sig: Inject 0.4 mLs (40 mg) subcutaneously every 7 days.   Rationale: More cost-effective medication available - Cost - Adherence   Recommendation: Change Medication - Simlandi (1 Pen) 40 MG/0.4ML Ajkt   Status: Accepted per CPA   Identified Date: 6/30/2025 Completed Date: 6/30/2025         2 Rationale: Preventive therapy - Needs additional medication therapy - Indication   Recommendation: Order Vaccine - Shingrix 50 MCG/0.5ML Susr   Status: Patient Agreed - Adherence/Education   Identified Date: 6/30/2025 Completed Date: 6/30/2025

## 2025-06-30 NOTE — PATIENT INSTRUCTIONS
Recommendations from today's MTM visit:                                                      Switch from Amjevita (adalimumab-atto) to Simlandi (adalimumab-ryvk) 40 mg weekly     Need to obtain prior authorization (will update you on the status of this)     Refills will be sent to [facility] (#[phone])    Simlandi Copay Card (should cover $9500 annually): [email or web address]    Simlandi Demonstration Video: [email or web address]    Receive second Shingrix vaccine on 7/3/25 at 10:00 AM at [facility]    Send a [employer] message to Dr. Calles's care team to schedule a follow-up for around 3 months from now (~9/30/25)     Follow-up: with me (Lucretia Mccracken [facility]) for an MTM follow up appointment by phone in around 6 months (1/1/2026)     My Clinical Pharmacist's contact information:                                                      Please feel free to contact me with any questions or concerns you have.      Lucretia Mccracken, PharmD  MTM Pharmacist  [facility]

## 2025-06-30 NOTE — Clinical Note
Doing well on adalimumab. Started ~3 months ago. Will get on your schedule for follow-up in around 3 months then you can decide whether to do an annual or every 6 mo f/up moving forward   Lucretia

## 2025-07-16 ENCOUNTER — MYC MEDICAL ADVICE (OUTPATIENT)
Dept: PHARMACY | Facility: CLINIC | Age: 50
End: 2025-07-16
Payer: COMMERCIAL

## 2025-08-10 ENCOUNTER — HEALTH MAINTENANCE LETTER (OUTPATIENT)
Age: 50
End: 2025-08-10

## (undated) DEVICE — BLADE LARYNGOSCOPE AMBU ASCOPE 4 510-001-000

## (undated) RX ORDER — PROPOFOL 10 MG/ML
INJECTION, EMULSION INTRAVENOUS
Status: DISPENSED
Start: 2022-04-19